# Patient Record
Sex: MALE | Race: ASIAN | NOT HISPANIC OR LATINO | Employment: PART TIME | URBAN - METROPOLITAN AREA
[De-identification: names, ages, dates, MRNs, and addresses within clinical notes are randomized per-mention and may not be internally consistent; named-entity substitution may affect disease eponyms.]

---

## 2022-10-12 ENCOUNTER — OFFICE VISIT (OUTPATIENT)
Dept: OTOLARYNGOLOGY | Facility: CLINIC | Age: 22
End: 2022-10-12
Payer: COMMERCIAL

## 2022-10-12 DIAGNOSIS — J35.1 TONSILLAR HYPERTROPHY: ICD-10-CM

## 2022-10-12 DIAGNOSIS — K21.9 LARYNGOPHARYNGEAL REFLUX (LPR): ICD-10-CM

## 2022-10-12 DIAGNOSIS — J37.0 CHRONIC LARYNGITIS: Primary | ICD-10-CM

## 2022-10-12 DIAGNOSIS — J04.0 ACUTE LARYNGITIS: ICD-10-CM

## 2022-10-12 PROCEDURE — 99203 OFFICE O/P NEW LOW 30 MIN: CPT | Mod: 25 | Performed by: OTOLARYNGOLOGY

## 2022-10-12 PROCEDURE — 31575 DIAGNOSTIC LARYNGOSCOPY: CPT | Performed by: OTOLARYNGOLOGY

## 2022-10-12 NOTE — LETTER
10/12/2022         RE: Rylee P Kwiatkowski  [address] [id number]        Dear Colleague,    Thank you for referring your patient, Rylee P Kwiatkowski, to the [facility]. Please see a copy of my visit note below.    HPI: This patient is a 23yo M who presents for evaluation of the tonsils. He had issues as a kid with recurrent strep, but not so much in the last few years. What has been a globus sensation for a long time, accompanied with PND. More recently, he has had voice issues after straining at a football game and then a live concert. Otherwise healthy and without fever, weight loss, odynophagia, dysphagia, hemoptysis, shortness of breath, or other major symptoms. Does not complain of sour taste, heartburn, or burping. Has not taking reflux medication. Does frequently eat/drink late at night before laying down to sleep. No snoring, no sleep apnea.    PHYSICAL EXAMINATION:  GEN: no acute distress, normocephalic  EYES: extraocular movements are intact, pupils are equal and round. Sclera clear.   EARS: auricles are normally formed. The external auditory canals are clear with minimal to no cerumen. Tympanic membranes are intact bilaterally with no signs of infection, effusion, retractions, or perforations.  NOSE: anterior nares are patent. There are no masses or lesions. The septum is non-obstructing.  OC/OP: clear, dentition is in good repair. The tongue and palate are fully mobile and symmetric. No masses or lesions. Cobblestoning of the posterior pharyngeal wall. Tonsils are 3.5+  HP/L (scope): nasopharynx, base of tongue, vallecula, epiglottis, and pyriform sinuses are clear. The bilateral vocal folds are mobile and without lesion, but there is evidence of intracordal edema c/w recent vocal trauma. 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  NECK: soft and supple. No lymphadenopathy or masses. Airway is  midline.  NEURO: CN VII and XII symmetric. alert and oriented. No spontaneous nystagmus. Gait is normal.  PULM: breathing comfortably on room air, normal chest expansion with respiration  CARDS: no cyanosis or clubbing, normal carotid pulses    FLEXIBLE LARYNGOSCOPY:Scope inserted bilaterally to examine nasal tissue, nasopharynx, posterior oropharynx, hypopharynx, and larynx. See exam notes for exam finding details. Patient tolerated the procedure well.    MEDICAL DECISION-MAKING: This patient is a 21yo M with acute on chronic laryngitis/globus sensation from underlying acid reflux and recent vocal abuse. Discussed diet and lifestyle changes. Will also need to exercise some voice rest in the coming weeks. He is not having issues as a result of his tonsillar hypertrophy at this time, so not recommending any tonsillar intervention at this time.         Again, thank you for allowing me to participate in the care of your patient.        Sincerely,        Danita Mercedes MD

## 2022-10-12 NOTE — PROGRESS NOTES
HPI: This patient is a 23yo M who presents for evaluation of the tonsils. He had issues as a kid with recurrent strep, but not so much in the last few years. What has been a globus sensation for a long time, accompanied with PND. More recently, he has had voice issues after straining at a football game and then a live concert. Otherwise healthy and without fever, weight loss, odynophagia, dysphagia, hemoptysis, shortness of breath, or other major symptoms. Does not complain of sour taste, heartburn, or burping. Has not taking reflux medication. Does frequently eat/drink late at night before laying down to sleep. No snoring, no sleep apnea.    PHYSICAL EXAMINATION:  GEN: no acute distress, normocephalic  EYES: extraocular movements are intact, pupils are equal and round. Sclera clear.   EARS: auricles are normally formed. The external auditory canals are clear with minimal to no cerumen. Tympanic membranes are intact bilaterally with no signs of infection, effusion, retractions, or perforations.  NOSE: anterior nares are patent. There are no masses or lesions. The septum is non-obstructing.  OC/OP: clear, dentition is in good repair. The tongue and palate are fully mobile and symmetric. No masses or lesions. Cobblestoning of the posterior pharyngeal wall. Tonsils are 3.5+  HP/L (scope): nasopharynx, base of tongue, vallecula, epiglottis, and pyriform sinuses are clear. The bilateral vocal folds are mobile and without lesion, but there is evidence of intracordal edema c/w recent vocal trauma. 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  NECK: soft and supple. No lymphadenopathy or masses. Airway is midline.  NEURO: CN VII and XII symmetric. alert and oriented. No spontaneous nystagmus. Gait is normal.  PULM: breathing comfortably on room air, normal chest expansion with respiration  CARDS: no cyanosis or clubbing, normal carotid pulses    FLEXIBLE  LARYNGOSCOPY:Scope inserted bilaterally to examine nasal tissue, nasopharynx, posterior oropharynx, hypopharynx, and larynx. See exam notes for exam finding details. Patient tolerated the procedure well.    MEDICAL DECISION-MAKING: This patient is a 23yo M with acute on chronic laryngitis/globus sensation from underlying acid reflux and recent vocal abuse. Discussed diet and lifestyle changes. Will also need to exercise some voice rest in the coming weeks. He is not having issues as a result of his tonsillar hypertrophy at this time, so not recommending any tonsillar intervention at this time.